# Patient Record
Sex: MALE | Race: NATIVE HAWAIIAN OR OTHER PACIFIC ISLANDER
[De-identification: names, ages, dates, MRNs, and addresses within clinical notes are randomized per-mention and may not be internally consistent; named-entity substitution may affect disease eponyms.]

---

## 2022-09-27 PROBLEM — Z00.00 ENCOUNTER FOR PREVENTIVE HEALTH EXAMINATION: Status: ACTIVE | Noted: 2022-09-27

## 2022-09-29 ENCOUNTER — APPOINTMENT (OUTPATIENT)
Dept: ULTRASOUND IMAGING | Facility: CLINIC | Age: 72
End: 2022-09-29

## 2022-11-01 ENCOUNTER — OFFICE (OUTPATIENT)
Dept: URBAN - METROPOLITAN AREA CLINIC 35 | Facility: CLINIC | Age: 72
Setting detail: OPHTHALMOLOGY
End: 2022-11-01
Payer: MEDICARE

## 2022-11-01 DIAGNOSIS — H40.1131: ICD-10-CM

## 2022-11-01 DIAGNOSIS — H18.413: ICD-10-CM

## 2022-11-01 PROCEDURE — 92083 EXTENDED VISUAL FIELD XM: CPT | Performed by: OPHTHALMOLOGY

## 2022-11-01 PROCEDURE — 92133 CPTRZD OPH DX IMG PST SGM ON: CPT | Performed by: OPHTHALMOLOGY

## 2022-11-01 PROCEDURE — 99213 OFFICE O/P EST LOW 20 MIN: CPT | Performed by: OPHTHALMOLOGY

## 2022-11-01 ASSESSMENT — REFRACTION_MANIFEST
OS_VA1: 20/20
OS_AXIS: 180
OD_VA1: 20/20
OD_ADD: +4.00
OD_AXIS: 005
OS_VA1: 20/25
OS_ADD: +4.00
OS_SPHERE: -4.25
OS_AXIS: 180
OD_VA1: 20/25
OS_CYLINDER: +3.00
OD_ADD: +3.50
OD_SPHERE: -3.75
OD_AXIS: 005
OS_SPHERE: -4.00
OS_CYLINDER: +3.00
OD_CYLINDER: +3.25
OS_ADD: +3.50
OD_SPHERE: -3.50
OD_CYLINDER: +2.75

## 2022-11-01 ASSESSMENT — KERATOMETRY
METHOD_AUTO_MANUAL: AUTO
OD_K2POWER_DIOPTERS: 44.50
OS_K2POWER_DIOPTERS: 44.50
OD_AXISANGLE_DEGREES: 004
OS_K1POWER_DIOPTERS: 43.25
OD_K1POWER_DIOPTERS: 42.50
OS_AXISANGLE_DEGREES: 004

## 2022-11-01 ASSESSMENT — REFRACTION_AUTOREFRACTION
OS_SPHERE: -4.00
OD_AXIS: 007
OD_SPHERE: -4.50
OD_CYLINDER: +3.75
OS_AXIS: 180
OS_CYLINDER: +2.75

## 2022-11-01 ASSESSMENT — AXIALLENGTH_DERIVED
OS_AL: 24.4589
OD_AL: 24.4499
OS_AL: 24.564
OS_AL: 24.5114
OD_AL: 24.4499
OD_AL: 24.661

## 2022-11-01 ASSESSMENT — PACHYMETRY
OD_CT_UM: 532
OD_CT_CORRECTION: 1
OS_CT_CORRECTION: 1
OS_CT_UM: 535

## 2022-11-01 ASSESSMENT — TONOMETRY
OD_IOP_MMHG: 13
OS_IOP_MMHG: 14

## 2022-11-01 ASSESSMENT — SUPERFICIAL PUNCTATE KERATITIS (SPK)
OS_SPK: T
OD_SPK: T

## 2022-11-01 ASSESSMENT — REFRACTION_CURRENTRX
OD_OVR_VA: 20/
OS_CYLINDER: +3.25
OD_SPHERE: -3.75
OD_ADD: +3.25
OS_VPRISM_DIRECTION: PROGS
OD_CYLINDER: +3.25
OD_AXIS: 179
OS_AXIS: 003
OS_SPHERE: -4.50
OS_OVR_VA: 20/
OD_VPRISM_DIRECTION: PROGS
OS_ADD: +3.25

## 2022-11-01 ASSESSMENT — CONFRONTATIONAL VISUAL FIELD TEST (CVF)
OS_FINDINGS: FULL
OD_FINDINGS: FULL

## 2022-11-01 ASSESSMENT — SPHEQUIV_DERIVED
OD_SPHEQUIV: -2.125
OD_SPHEQUIV: -2.125
OS_SPHEQUIV: -2.625
OS_SPHEQUIV: -2.5
OD_SPHEQUIV: -2.625
OS_SPHEQUIV: -2.75

## 2022-11-01 ASSESSMENT — VISUAL ACUITY
OD_BCVA: 20/30-2
OS_BCVA: 20/60-1

## 2023-02-01 ENCOUNTER — OFFICE (OUTPATIENT)
Dept: URBAN - METROPOLITAN AREA CLINIC 35 | Facility: CLINIC | Age: 73
Setting detail: OPHTHALMOLOGY
End: 2023-02-01
Payer: MEDICARE

## 2023-02-01 DIAGNOSIS — H40.1131: ICD-10-CM

## 2023-02-01 DIAGNOSIS — H18.413: ICD-10-CM

## 2023-02-01 DIAGNOSIS — H11.153: ICD-10-CM

## 2023-02-01 PROCEDURE — 99213 OFFICE O/P EST LOW 20 MIN: CPT | Performed by: OPHTHALMOLOGY

## 2023-02-01 ASSESSMENT — REFRACTION_MANIFEST
OD_CYLINDER: +2.75
OD_VA1: 20/20
OD_AXIS: 005
OD_ADD: +3.50
OD_SPHERE: -3.50
OS_ADD: +3.50
OD_CYLINDER: +3.25
OS_SPHERE: -4.25
OS_CYLINDER: +3.00
OS_VA1: 20/25
OS_SPHERE: -4.00
OS_AXIS: 180
OD_VA1: 20/25
OS_ADD: +4.00
OD_ADD: +4.00
OD_AXIS: 005
OS_AXIS: 180
OS_VA1: 20/20
OD_SPHERE: -3.75
OS_CYLINDER: +3.00

## 2023-02-01 ASSESSMENT — VISUAL ACUITY
OS_BCVA: 20/40-2
OD_BCVA: 20/40-2

## 2023-02-01 ASSESSMENT — REFRACTION_AUTOREFRACTION
OD_CYLINDER: +3.50
OS_AXIS: 000
OS_SPHERE: -4.25
OD_SPHERE: -4.25
OD_AXIS: 005
OS_CYLINDER: +3.00

## 2023-02-01 ASSESSMENT — REFRACTION_CURRENTRX
OD_SPHERE: -3.75
OD_CYLINDER: +3.25
OD_ADD: +3.25
OD_AXIS: 179
OS_VPRISM_DIRECTION: PROGS
OD_OVR_VA: 20/
OS_AXIS: 003
OD_VPRISM_DIRECTION: PROGS
OS_OVR_VA: 20/
OS_ADD: +3.25
OS_CYLINDER: +3.25
OS_SPHERE: -4.50

## 2023-02-01 ASSESSMENT — PACHYMETRY
OD_CT_CORRECTION: 1
OD_CT_UM: 532
OS_CT_CORRECTION: 1
OS_CT_UM: 535

## 2023-02-01 ASSESSMENT — CONFRONTATIONAL VISUAL FIELD TEST (CVF)
OS_FINDINGS: FULL
OD_FINDINGS: FULL

## 2023-02-01 ASSESSMENT — KERATOMETRY
OS_AXISANGLE_DEGREES: 005
METHOD_AUTO_MANUAL: AUTO
OS_K2POWER_DIOPTERS: 44.50
OS_K1POWER_DIOPTERS: 43.00
OD_AXISANGLE_DEGREES: 002
OD_K2POWER_DIOPTERS: 44.50
OD_K1POWER_DIOPTERS: 42.50

## 2023-02-01 ASSESSMENT — AXIALLENGTH_DERIVED
OS_AL: 24.5084
OS_AL: 24.6139
OS_AL: 24.6139
OD_AL: 24.4499
OD_AL: 24.6079
OD_AL: 24.4499

## 2023-02-01 ASSESSMENT — SPHEQUIV_DERIVED
OS_SPHEQUIV: -2.75
OD_SPHEQUIV: -2.5
OD_SPHEQUIV: -2.125
OD_SPHEQUIV: -2.125
OS_SPHEQUIV: -2.75
OS_SPHEQUIV: -2.5

## 2023-02-01 ASSESSMENT — TONOMETRY
OS_IOP_MMHG: 19
OD_IOP_MMHG: 15

## 2023-02-01 ASSESSMENT — SUPERFICIAL PUNCTATE KERATITIS (SPK)
OS_SPK: T
OD_SPK: T

## 2023-03-06 PROBLEM — H04.553 NLDO: Status: ACTIVE | Noted: 2023-03-06

## 2023-03-17 ENCOUNTER — APPOINTMENT (OUTPATIENT)
Dept: GASTROENTEROLOGY | Facility: CLINIC | Age: 73
End: 2023-03-17
Payer: MEDICARE

## 2023-03-17 VITALS
SYSTOLIC BLOOD PRESSURE: 122 MMHG | HEIGHT: 67 IN | OXYGEN SATURATION: 98 % | WEIGHT: 170 LBS | BODY MASS INDEX: 26.68 KG/M2 | HEART RATE: 76 BPM | DIASTOLIC BLOOD PRESSURE: 70 MMHG | TEMPERATURE: 97.5 F

## 2023-03-17 PROCEDURE — 99204 OFFICE O/P NEW MOD 45 MIN: CPT

## 2023-03-17 RX ORDER — ASPIRIN 81 MG/1
81 TABLET ORAL
Refills: 0 | Status: ACTIVE | COMMUNITY

## 2023-03-17 RX ORDER — LINACLOTIDE 145 UG/1
145 CAPSULE, GELATIN COATED ORAL
Refills: 0 | Status: ACTIVE | COMMUNITY

## 2023-03-17 RX ORDER — METFORMIN HYDROCHLORIDE 1000 MG/1
1000 TABLET, COATED ORAL
Refills: 0 | Status: ACTIVE | COMMUNITY

## 2023-03-17 RX ORDER — PRAVASTATIN SODIUM 80 MG/1
80 TABLET ORAL
Refills: 0 | Status: ACTIVE | COMMUNITY

## 2023-03-17 RX ORDER — CARBIDOPA 25 MG/1
25 TABLET ORAL
Refills: 0 | Status: ACTIVE | COMMUNITY

## 2023-03-17 RX ORDER — CLOPIDOGREL 75 MG/1
75 TABLET, FILM COATED ORAL
Refills: 0 | Status: ACTIVE | COMMUNITY

## 2023-03-17 RX ORDER — DORZOLAMIDE HYDROCHLORIDE AND TIMOLOL MALEATE PRESERVATIVE FREE 20; 5 MG/ML; MG/ML
SOLUTION/ DROPS OPHTHALMIC
Refills: 0 | Status: ACTIVE | COMMUNITY

## 2023-03-17 RX ORDER — LATANOPROST/PF 0.005 %
0.01 DROPS OPHTHALMIC (EYE)
Refills: 0 | Status: ACTIVE | COMMUNITY

## 2023-03-17 RX ORDER — ISOSORBIDE MONONITRATE 30 MG
30 TABLET, EXTENDED RELEASE 24 HR ORAL
Refills: 0 | Status: ACTIVE | COMMUNITY

## 2023-03-17 RX ORDER — METOPROLOL SUCCINATE 25 MG/1
25 TABLET, EXTENDED RELEASE ORAL
Refills: 0 | Status: ACTIVE | COMMUNITY

## 2023-03-17 NOTE — ASSESSMENT
[FreeTextEntry1] : The patient is a 72-year-old male who has a history of Parkinson's disease as well as coronary artery disease hypothyroidism and type 2 diabetes.  Patient has a remote history of colitis which is currently in clinical remission.  In fact, the patient is constipated likely on the basis of his Parkinson's disease and medications.  The patient has no clinical symptoms of intestinal obstruction.  The patient was instructed to increase the Linzess to 290 mcg once a day and start soluble fiber supplements.  He was taking MiraLAX on and off which he can stop.  Patient or his daughter will get back to me with a progress report in 2 weeks.  If the higher dose Linzess is not efficacious we may switch him over to Trulance or Amitiza.  Patient is not due for repeat colonoscopy at the present time.

## 2023-03-17 NOTE — PHYSICAL EXAM
[Alert] : alert [Healthy Appearing] : healthy appearing [No Acute Distress] : no acute distress [No Respiratory Distress] : no respiratory distress [No Acc Muscle Use] : no accessory muscle use [Heart Rate And Rhythm] : heart rate was normal and rhythm regular [Murmurs] : no murmurs [Bowel Sounds] : normal bowel sounds [Abdomen Tenderness] : non-tender [No Masses] : no abdominal mass palpated [Abdomen Soft] : soft [de-identified] : The patient does have a typical parkinsonian tremor.

## 2023-03-17 NOTE — REVIEW OF SYSTEMS
[Fever] : no fever [Chills] : no chills [Recent Weight Loss (___ Lbs)] : no recent weight loss [Chest Pain] : no chest pain [Palpitations] : no palpitations [Cough] : no cough [SOB on Exertion] : no shortness of breath during exertion [Abdominal Pain] : no abdominal pain [Constipation] : constipation [Diarrhea] : no diarrhea [Heartburn] : no heartburn [Melena (black stool)] : no melena [As Noted in HPI] : as noted in HPI [de-identified] : The patient has Parkinson's disease

## 2023-05-01 RX ORDER — MESALAMINE 0.38 G/1
0.38 CAPSULE, EXTENDED RELEASE ORAL
Qty: 360 | Refills: 3 | Status: ACTIVE | COMMUNITY
Start: 1900-01-01 | End: 1900-01-01

## 2023-05-09 ENCOUNTER — NON-APPOINTMENT (OUTPATIENT)
Age: 73
End: 2023-05-09

## 2023-05-09 RX ORDER — SODIUM PICOSULFATE, MAGNESIUM OXIDE, AND ANHYDROUS CITRIC ACID 10; 3.5; 12 MG/160ML; G/160ML; G/160ML
10-3.5-12 MG-GM LIQUID ORAL
Qty: 2 | Refills: 0 | Status: ACTIVE | COMMUNITY
Start: 2023-05-09 | End: 1900-01-01

## 2023-05-23 ENCOUNTER — OFFICE (OUTPATIENT)
Dept: URBAN - METROPOLITAN AREA CLINIC 35 | Facility: CLINIC | Age: 73
Setting detail: OPHTHALMOLOGY
End: 2023-05-23
Payer: MEDICARE

## 2023-05-23 DIAGNOSIS — H40.1131: ICD-10-CM

## 2023-05-23 DIAGNOSIS — H52.4: ICD-10-CM

## 2023-05-23 DIAGNOSIS — H25.13: ICD-10-CM

## 2023-05-23 DIAGNOSIS — H18.413: ICD-10-CM

## 2023-05-23 DIAGNOSIS — E11.3292: ICD-10-CM

## 2023-05-23 DIAGNOSIS — H52.7: ICD-10-CM

## 2023-05-23 PROBLEM — H11.15 PINGUECULA: Status: ACTIVE | Noted: 2023-05-23

## 2023-05-23 PROCEDURE — 92015 DETERMINE REFRACTIVE STATE: CPT | Performed by: OPHTHALMOLOGY

## 2023-05-23 PROCEDURE — 92014 COMPRE OPH EXAM EST PT 1/>: CPT | Performed by: OPHTHALMOLOGY

## 2023-05-23 PROCEDURE — 92250 FUNDUS PHOTOGRAPHY W/I&R: CPT | Performed by: OPHTHALMOLOGY

## 2023-05-23 ASSESSMENT — PACHYMETRY
OS_CT_UM: 535
OS_CT_CORRECTION: 1
OD_CT_CORRECTION: 1
OD_CT_UM: 532

## 2023-05-23 ASSESSMENT — SPHEQUIV_DERIVED
OD_SPHEQUIV: -2.875
OD_SPHEQUIV: -2.125
OS_SPHEQUIV: -2.75
OD_SPHEQUIV: -2.625
OS_SPHEQUIV: -2.5
OD_SPHEQUIV: -2.125

## 2023-05-23 ASSESSMENT — REFRACTION_AUTOREFRACTION
OS_CYLINDER: +3.00
OS_SPHERE: -4.25
OD_CYLINDER: +3.75
OS_AXIS: 003
OD_SPHERE: -4.75
OD_AXIS: 008

## 2023-05-23 ASSESSMENT — CONFRONTATIONAL VISUAL FIELD TEST (CVF)
OS_FINDINGS: FULL
OD_FINDINGS: FULL

## 2023-05-23 ASSESSMENT — REFRACTION_MANIFEST
OD_CYLINDER: +3.25
OS_CYLINDER: +3.00
OS_SPHERE: -4.25
OD_ADD: +3.50
OS_CYLINDER: +3.00
OD_SPHERE: -3.50
OD_ADD: +3.50
OS_VA1: 20/25
OS_ADD: +3.50
OD_CYLINDER: +3.25
OS_AXIS: 180
OS_AXIS: 180
OS_SPHERE: -4.00
OD_SPHERE: -3.75
OS_AXIS: 180
OD_ADD: +4.00
OS_VA1: 20/20
OD_SPHERE: -4.25
OD_AXIS: 010
OS_CYLINDER: +3.00
OD_VA1: 20/20
OS_ADD: +4.00
OS_SPHERE: -4.25
OS_ADD: +3.50
OS_VA1: 20/25
OD_AXIS: 005
OD_CYLINDER: +2.75
OD_AXIS: 005
OD_VA1: 20/30
OD_VA1: 20/25

## 2023-05-23 ASSESSMENT — AXIALLENGTH_DERIVED
OD_AL: 24.7112
OS_AL: 24.664
OS_AL: 24.558
OD_AL: 24.4993
OD_AL: 24.8186
OD_AL: 24.4993

## 2023-05-23 ASSESSMENT — REFRACTION_CURRENTRX
OD_CYLINDER: +3.50
OS_CYLINDER: +3.25
OS_AXIS: 002
OS_OVR_VA: 20/
OS_SPHERE: -4.25
OS_ADD: +3.25
OD_SPHERE: -3.75
OD_AXIS: 009
OD_OVR_VA: 20/
OD_VPRISM_DIRECTION: PROGS
OS_VPRISM_DIRECTION: PROGS
OD_ADD: +3.25

## 2023-05-23 ASSESSMENT — KERATOMETRY
OD_K2POWER_DIOPTERS: 44.25
OD_K1POWER_DIOPTERS: 42.50
OS_AXISANGLE_DEGREES: 005
OS_K1POWER_DIOPTERS: 42.75
OS_K2POWER_DIOPTERS: 44.50
OD_AXISANGLE_DEGREES: 008
METHOD_AUTO_MANUAL: AUTO

## 2023-05-23 ASSESSMENT — SUPERFICIAL PUNCTATE KERATITIS (SPK)
OD_SPK: T
OS_SPK: T

## 2023-05-23 ASSESSMENT — TONOMETRY
OS_IOP_MMHG: 12
OD_IOP_MMHG: 10

## 2023-05-23 ASSESSMENT — VISUAL ACUITY
OD_BCVA: 20/40-2
OS_BCVA: 20/40-2

## 2023-05-25 ENCOUNTER — RX RENEWAL (OUTPATIENT)
Age: 73
End: 2023-05-25

## 2023-05-31 ENCOUNTER — APPOINTMENT (OUTPATIENT)
Dept: GASTROENTEROLOGY | Facility: AMBULATORY MEDICAL SERVICES | Age: 73
End: 2023-05-31
Payer: MEDICARE

## 2023-05-31 PROCEDURE — 45380 COLONOSCOPY AND BIOPSY: CPT

## 2023-09-07 ENCOUNTER — APPOINTMENT (OUTPATIENT)
Dept: GASTROENTEROLOGY | Facility: CLINIC | Age: 73
End: 2023-09-07
Payer: MEDICARE

## 2023-09-07 ENCOUNTER — RX RENEWAL (OUTPATIENT)
Age: 73
End: 2023-09-07

## 2023-09-07 VITALS
WEIGHT: 152 LBS | DIASTOLIC BLOOD PRESSURE: 76 MMHG | HEIGHT: 67 IN | SYSTOLIC BLOOD PRESSURE: 110 MMHG | HEART RATE: 81 BPM | TEMPERATURE: 97.1 F | BODY MASS INDEX: 23.86 KG/M2 | OXYGEN SATURATION: 98 %

## 2023-09-07 PROCEDURE — 99214 OFFICE O/P EST MOD 30 MIN: CPT

## 2023-09-07 NOTE — REVIEW OF SYSTEMS
[Fever] : no fever [Chills] : no chills [Recent Weight Loss (___ Lbs)] : no recent weight loss [Chest Pain] : no chest pain [Palpitations] : no palpitations [SOB on Exertion] : no shortness of breath during exertion [Abdominal Pain] : no abdominal pain [Constipation] : constipation [Diarrhea] : no diarrhea [Heartburn] : no heartburn [Melena (black stool)] : no melena [Bleeding] : no bleeding [Bloating (gassiness)] : no bloating

## 2023-09-07 NOTE — PHYSICAL EXAM
[Alert] : alert [No Acute Distress] : no acute distress [No Respiratory Distress] : no respiratory distress [Auscultation Breath Sounds / Voice Sounds] : lungs were clear to auscultation bilaterally [Heart Rate And Rhythm] : heart rate was normal and rhythm regular [Murmurs] : no murmurs [Bowel Sounds] : normal bowel sounds [No Masses] : no abdominal mass palpated [Abdomen Soft] : soft [] : no hepatosplenomegaly [Patient Refused] : rectal exam was refused by the patient

## 2023-09-07 NOTE — HISTORY OF PRESENT ILLNESS
[FreeTextEntry1] : The patient had a colonoscopy earlier this year which did reveal disease activity.

## 2023-09-07 NOTE — ASSESSMENT
[FreeTextEntry1] : The patient is a 73-year-old male with a history of Parkinson's disease, type 2 diabetes and coronary artery disease.  The patient has ulcerative colitis but interestingly is plagued by significant constipation.  This is likely on the basis of the patient's underlying Parkinson's disease and medications.  He tried Linzess 145 mcg with no results.  I asked the patient to increase the medication to 290 mcg once a day for the next 4 to 5 days.  If there are no results, we may need to give the patient a formal bowel prep to clean him out.  I am not happy with the fact that the patient has underlying colonic inflammation, however he is hesitant to change his medication at the present time.  Mr. Miner will get back to me with a progress report in several days.

## 2023-09-18 ENCOUNTER — OFFICE (OUTPATIENT)
Dept: URBAN - METROPOLITAN AREA CLINIC 35 | Facility: CLINIC | Age: 73
Setting detail: OPHTHALMOLOGY
End: 2023-09-18
Payer: MEDICARE

## 2023-09-18 DIAGNOSIS — H18.413: ICD-10-CM

## 2023-09-18 DIAGNOSIS — H25.13: ICD-10-CM

## 2023-09-18 DIAGNOSIS — H11.153: ICD-10-CM

## 2023-09-18 DIAGNOSIS — H40.1131: ICD-10-CM

## 2023-09-18 PROCEDURE — 99213 OFFICE O/P EST LOW 20 MIN: CPT | Performed by: OPHTHALMOLOGY

## 2023-09-18 ASSESSMENT — REFRACTION_MANIFEST
OS_CYLINDER: +3.00
OD_AXIS: 010
OD_ADD: +4.00
OS_ADD: +3.50
OS_CYLINDER: +3.00
OD_SPHERE: -4.25
OD_VA1: 20/30
OS_ADD: +4.00
OS_SPHERE: -4.00
OD_VA1: 20/20
OD_ADD: +3.50
OD_VA1: 20/25
OS_ADD: +3.50
OD_CYLINDER: +2.75
OD_SPHERE: -3.75
OS_CYLINDER: +3.00
OD_CYLINDER: +3.25
OD_AXIS: 005
OD_CYLINDER: +3.25
OS_AXIS: 180
OS_VA1: 20/25
OS_VA1: 20/20
OS_VA1: 20/25
OS_AXIS: 180
OS_SPHERE: -4.25
OD_SPHERE: -3.50
OS_AXIS: 180
OS_SPHERE: -4.25
OD_AXIS: 005
OD_ADD: +3.50

## 2023-09-18 ASSESSMENT — AXIALLENGTH_DERIVED
OD_AL: 24.4499
OS_AL: 24.6139
OD_AL: 24.4499
OS_AL: 24.5084
OS_AL: 24.6139
OD_AL: 24.661
OS_AL: 24.6139
OD_AL: 24.7678

## 2023-09-18 ASSESSMENT — REFRACTION_CURRENTRX
OS_VPRISM_DIRECTION: PROGS
OS_SPHERE: -4.25
OD_VPRISM_DIRECTION: PROGS
OD_AXIS: 005
OD_OVR_VA: 20/
OS_CYLINDER: +3.00
OS_OVR_VA: 20/
OS_AXIS: 005
OS_ADD: +3.50
OD_ADD: +3.50
OD_SPHERE: -4.50
OD_CYLINDER: +3.50

## 2023-09-18 ASSESSMENT — KERATOMETRY
OD_K2POWER_DIOPTERS: 44.50
OS_AXISANGLE_DEGREES: 010
OS_K2POWER_DIOPTERS: 44.75
OD_AXISANGLE_DEGREES: 004
OD_K1POWER_DIOPTERS: 42.50
METHOD_AUTO_MANUAL: AUTO
OS_K1POWER_DIOPTERS: 42.75

## 2023-09-18 ASSESSMENT — TONOMETRY
OS_IOP_MMHG: 16
OD_IOP_MMHG: 13

## 2023-09-18 ASSESSMENT — REFRACTION_AUTOREFRACTION
OS_CYLINDER: +3.50
OS_AXIS: 003
OS_SPHERE: -4.50
OD_AXIS: 007
OD_SPHERE: -4.75
OD_CYLINDER: +3.75

## 2023-09-18 ASSESSMENT — SPHEQUIV_DERIVED
OS_SPHEQUIV: -2.75
OS_SPHEQUIV: -2.5
OD_SPHEQUIV: -2.875
OS_SPHEQUIV: -2.75
OS_SPHEQUIV: -2.75
OD_SPHEQUIV: -2.625
OD_SPHEQUIV: -2.125
OD_SPHEQUIV: -2.125

## 2023-09-18 ASSESSMENT — PACHYMETRY
OD_CT_CORRECTION: 1
OS_CT_UM: 535
OS_CT_CORRECTION: 1
OD_CT_UM: 532

## 2023-09-18 ASSESSMENT — CONFRONTATIONAL VISUAL FIELD TEST (CVF)
OS_FINDINGS: FULL
OD_FINDINGS: FULL

## 2023-09-18 ASSESSMENT — VISUAL ACUITY
OS_BCVA: 20/40
OD_BCVA: 20/40-

## 2023-11-20 ENCOUNTER — RX RENEWAL (OUTPATIENT)
Age: 73
End: 2023-11-20

## 2023-11-22 ENCOUNTER — RX RENEWAL (OUTPATIENT)
Age: 73
End: 2023-11-22

## 2023-12-18 RX ORDER — LUBIPROSTONE 24 UG/1
24 CAPSULE ORAL
Qty: 180 | Refills: 3 | Status: ACTIVE | COMMUNITY
Start: 2023-09-18 | End: 1900-01-01

## 2024-01-22 ENCOUNTER — OFFICE (OUTPATIENT)
Dept: URBAN - METROPOLITAN AREA CLINIC 35 | Facility: CLINIC | Age: 74
Setting detail: OPHTHALMOLOGY
End: 2024-01-22
Payer: MEDICARE

## 2024-01-22 DIAGNOSIS — H18.413: ICD-10-CM

## 2024-01-22 DIAGNOSIS — H40.1131: ICD-10-CM

## 2024-01-22 DIAGNOSIS — H25.13: ICD-10-CM

## 2024-01-22 DIAGNOSIS — H11.153: ICD-10-CM

## 2024-01-22 PROCEDURE — 99213 OFFICE O/P EST LOW 20 MIN: CPT | Performed by: OPHTHALMOLOGY

## 2024-01-22 ASSESSMENT — REFRACTION_CURRENTRX
OD_CYLINDER: +3.50
OS_OVR_VA: 20/
OS_CYLINDER: +3.00
OS_AXIS: 005
OD_SPHERE: -4.50
OS_OVR_VA: 20/
OS_ADD: +3.75
OD_OVR_VA: 20/
OD_ADD: +3.75
OD_VPRISM_DIRECTION: PROGS
OS_SPHERE: -4.50
OD_OVR_VA: 20/
OS_VPRISM_DIRECTION: PROGS
OD_AXIS: 010

## 2024-01-22 ASSESSMENT — REFRACTION_MANIFEST
OS_CYLINDER: +3.00
OD_ADD: +3.50
OS_ADD: +3.50
OD_AXIS: 010
OD_VA1: 20/25
OD_AXIS: 005
OD_CYLINDER: +3.25
OS_CYLINDER: +3.00
OD_SPHERE: -3.75
OD_SPHERE: -5.25
OD_VA1: 20/20
OD_CYLINDER: +3.25
OD_AXIS: 005
OS_AXIS: 180
OS_SPHERE: -4.25
OD_CYLINDER: +3.25
OS_SPHERE: -4.50
OS_AXIS: 180
OS_AXIS: 180
OS_VA1: 20/25
OD_SPHERE: -3.50
OS_VA1: 20/25
OS_CYLINDER: +3.00
OS_SPHERE: -4.25
OS_VA1: 20/25
OD_CYLINDER: +2.75
OS_ADD: +4.00
OS_SPHERE: -4.00
OS_CYLINDER: +2.75
OD_VA1: 20/40+
OD_VA1: 20/30
OS_ADD: +3.50
OS_VA1: 20/20
OD_ADD: +3.50
OS_ADD: +3.50
OS_AXIS: 180
OD_ADD: +4.00
OD_SPHERE: -4.25
OD_ADD: +3.50
OD_AXIS: 010

## 2024-01-22 ASSESSMENT — REFRACTION_AUTOREFRACTION
OD_AXIS: 003
OD_SPHERE: -5.25
OD_CYLINDER: +3.25
OS_SPHERE: -4.50
OS_CYLINDER: +2.50
OS_AXIS: 003

## 2024-01-22 ASSESSMENT — SPHEQUIV_DERIVED
OD_SPHEQUIV: -2.125
OS_SPHEQUIV: -2.5
OS_SPHEQUIV: -3.125
OS_SPHEQUIV: -3.25
OS_SPHEQUIV: -2.75
OD_SPHEQUIV: -3.625
OS_SPHEQUIV: -2.75
OD_SPHEQUIV: -3.625
OD_SPHEQUIV: -2.125
OD_SPHEQUIV: -2.625

## 2024-01-22 ASSESSMENT — CONFRONTATIONAL VISUAL FIELD TEST (CVF)
OS_FINDINGS: FULL
OD_FINDINGS: FULL

## 2024-01-23 ENCOUNTER — RX ONLY (RX ONLY)
Age: 74
End: 2024-01-23

## 2024-01-23 ENCOUNTER — RX RENEWAL (OUTPATIENT)
Age: 74
End: 2024-01-23

## 2024-01-23 ENCOUNTER — OFFICE (OUTPATIENT)
Dept: URBAN - METROPOLITAN AREA CLINIC 34 | Facility: CLINIC | Age: 74
Setting detail: OPHTHALMOLOGY
End: 2024-01-23
Payer: MEDICARE

## 2024-01-23 DIAGNOSIS — H43.813: ICD-10-CM

## 2024-01-23 DIAGNOSIS — H01.004: ICD-10-CM

## 2024-01-23 DIAGNOSIS — H01.001: ICD-10-CM

## 2024-01-23 DIAGNOSIS — H00.022: ICD-10-CM

## 2024-01-23 DIAGNOSIS — H40.1131: ICD-10-CM

## 2024-01-23 DIAGNOSIS — H25.13: ICD-10-CM

## 2024-01-23 DIAGNOSIS — H00.025: ICD-10-CM

## 2024-01-23 DIAGNOSIS — H16.222: ICD-10-CM

## 2024-01-23 DIAGNOSIS — E11.9: ICD-10-CM

## 2024-01-23 PROBLEM — H35.031 HYPERTENSIVE RETINOPATHY; RIGHT EYE: Status: ACTIVE | Noted: 2024-01-23

## 2024-01-23 PROCEDURE — 99214 OFFICE O/P EST MOD 30 MIN: CPT | Performed by: OPHTHALMOLOGY

## 2024-01-23 PROCEDURE — 92134 CPTRZ OPH DX IMG PST SGM RTA: CPT | Performed by: OPHTHALMOLOGY

## 2024-01-23 RX ORDER — MESALAMINE 1.2 G/1
1.2 TABLET, DELAYED RELEASE ORAL
Qty: 270 | Refills: 0 | Status: ACTIVE | COMMUNITY
Start: 2024-01-23 | End: 1900-01-01

## 2024-01-23 ASSESSMENT — SPHEQUIV_DERIVED
OS_SPHEQUIV: -3
OS_SPHEQUIV: -2.5
OS_SPHEQUIV: -2.75
OD_SPHEQUIV: -2.125
OD_SPHEQUIV: -3.625
OD_SPHEQUIV: -2.625
OS_SPHEQUIV: -2.75
OD_SPHEQUIV: -3.625
OD_SPHEQUIV: -2.125
OS_SPHEQUIV: -3.125

## 2024-01-23 ASSESSMENT — REFRACTION_MANIFEST
OS_VA1: 20/20
OD_AXIS: 010
OS_ADD: +3.50
OS_CYLINDER: +3.00
OS_ADD: +3.50
OS_ADD: +4.00
OD_VA1: 20/40+
OD_VA1: 20/25
OS_VA1: 20/25
OS_SPHERE: -4.25
OS_AXIS: 180
OS_ADD: +3.50
OS_SPHERE: -4.00
OD_ADD: +3.50
OD_AXIS: 005
OD_VA1: 20/20
OS_CYLINDER: +3.00
OS_AXIS: 180
OD_CYLINDER: +3.25
OD_ADD: +4.00
OD_AXIS: 005
OD_CYLINDER: +3.25
OD_CYLINDER: +3.25
OD_SPHERE: -5.25
OS_CYLINDER: +2.75
OS_CYLINDER: +3.00
OS_SPHERE: -4.25
OS_AXIS: 180
OS_SPHERE: -4.50
OD_ADD: +3.50
OD_SPHERE: -3.75
OD_CYLINDER: +2.75
OD_AXIS: 010
OD_VA1: 20/30
OS_AXIS: 180
OS_VA1: 20/25
OD_SPHERE: -3.50
OD_SPHERE: -4.25
OS_VA1: 20/25
OD_ADD: +3.50

## 2024-01-23 ASSESSMENT — REFRACTION_AUTOREFRACTION
OS_AXIS: 005
OS_CYLINDER: +3.50
OD_CYLINDER: +3.25
OS_SPHERE: -4.75
OD_AXIS: 007
OD_SPHERE: -5.25

## 2024-01-23 ASSESSMENT — LID EXAM ASSESSMENTS
OS_BLEPHARITIS: LUL 1+
OD_MEIBOMITIS: RLL T 1+
OS_COMMENTS: TELANGIECTASIA, SHORTENED GLANDS
OD_COMMENTS: TELANGIECTASIA, SHORTENED GLANDS
OD_BLEPHARITIS: RUL 1+
OS_MEIBOMITIS: LLL T 1+

## 2024-01-23 ASSESSMENT — REFRACTION_CURRENTRX
OS_VPRISM_DIRECTION: PROGS
OS_ADD: +3.75
OD_VPRISM_DIRECTION: PROGS
OD_ADD: +3.75
OS_CYLINDER: +3.00
OD_SPHERE: -4.50
OS_AXIS: 005
OS_OVR_VA: 20/
OS_SPHERE: -4.50
OD_CYLINDER: +3.50
OD_OVR_VA: 20/
OD_AXIS: 010

## 2024-01-23 ASSESSMENT — CONFRONTATIONAL VISUAL FIELD TEST (CVF)
OD_FINDINGS: FULL
OS_FINDINGS: FULL

## 2024-01-23 ASSESSMENT — SUPERFICIAL PUNCTATE KERATITIS (SPK): OS_SPK: 1+

## 2024-01-23 ASSESSMENT — DRY EYES - PHYSICIAN NOTES: OS_GENERALCOMMENTS: INFERIOR LIMBUS

## 2024-02-05 ENCOUNTER — OFFICE (OUTPATIENT)
Dept: URBAN - METROPOLITAN AREA CLINIC 34 | Facility: CLINIC | Age: 74
Setting detail: OPHTHALMOLOGY
End: 2024-02-05
Payer: MEDICARE

## 2024-02-05 DIAGNOSIS — H25.11: ICD-10-CM

## 2024-02-05 DIAGNOSIS — H25.13: ICD-10-CM

## 2024-02-05 PROCEDURE — 92136 OPHTHALMIC BIOMETRY: CPT | Mod: 26,RT | Performed by: OPHTHALMOLOGY

## 2024-02-05 PROCEDURE — 92136 OPHTHALMIC BIOMETRY: CPT | Mod: TC | Performed by: OPHTHALMOLOGY

## 2024-02-05 PROCEDURE — 99213 OFFICE O/P EST LOW 20 MIN: CPT | Performed by: OPHTHALMOLOGY

## 2024-02-05 ASSESSMENT — REFRACTION_CURRENTRX
OS_CYLINDER: +3.00
OD_ADD: +3.75
OS_VPRISM_DIRECTION: PROGS
OS_OVR_VA: 20/
OD_SPHERE: -4.50
OD_OVR_VA: 20/
OD_CYLINDER: +3.50
OS_ADD: +3.75
OD_VPRISM_DIRECTION: PROGS
OS_AXIS: 005
OS_SPHERE: -4.50
OD_AXIS: 010

## 2024-02-05 ASSESSMENT — REFRACTION_MANIFEST
OS_CYLINDER: +3.00
OS_AXIS: 180
OS_ADD: +3.50
OD_ADD: +3.50
OD_AXIS: 010
OD_VA1: 20/25
OD_CYLINDER: +3.25
OS_CYLINDER: +3.00
OD_CYLINDER: +3.25
OS_CYLINDER: +3.00
OS_CYLINDER: +2.75
OS_VA1: 20/25
OS_ADD: +4.00
OD_SPHERE: -3.75
OD_SPHERE: -4.25
OS_AXIS: 180
OS_SPHERE: -4.00
OD_ADD: +4.00
OS_AXIS: 180
OD_SPHERE: -3.50
OD_SPHERE: -5.25
OD_ADD: +3.50
OS_SPHERE: -4.50
OS_ADD: +3.50
OD_CYLINDER: +2.75
OD_VA1: 20/30
OS_ADD: +3.50
OD_CYLINDER: +3.25
OS_VA1: 20/20
OD_VA1: 20/40+
OS_SPHERE: -4.25
OS_VA1: 20/25
OD_ADD: +3.50
OS_AXIS: 180
OD_VA1: 20/20
OD_AXIS: 010
OS_SPHERE: -4.25
OS_VA1: 20/25
OD_AXIS: 005
OD_AXIS: 005

## 2024-02-05 ASSESSMENT — REFRACTION_AUTOREFRACTION
OD_SPHERE: -5.50
OS_AXIS: 002
OD_AXIS: 006
OS_CYLINDER: +3.25
OS_SPHERE: -4.50
OD_CYLINDER: +3.75

## 2024-02-05 ASSESSMENT — SPHEQUIV_DERIVED
OS_SPHEQUIV: -2.5
OS_SPHEQUIV: -2.75
OD_SPHEQUIV: -2.625
OD_SPHEQUIV: -3.625
OD_SPHEQUIV: -2.125
OD_SPHEQUIV: -2.125
OS_SPHEQUIV: -3.125
OD_SPHEQUIV: -3.625
OS_SPHEQUIV: -2.75
OS_SPHEQUIV: -2.875

## 2024-02-05 ASSESSMENT — SUPERFICIAL PUNCTATE KERATITIS (SPK): OS_SPK: 1+

## 2024-02-05 ASSESSMENT — CONFRONTATIONAL VISUAL FIELD TEST (CVF)
OS_FINDINGS: FULL
OD_FINDINGS: FULL

## 2024-02-05 ASSESSMENT — DRY EYES - PHYSICIAN NOTES: OS_GENERALCOMMENTS: INFERIOR LIMBUS

## 2024-02-22 ENCOUNTER — ASC (OUTPATIENT)
Dept: URBAN - METROPOLITAN AREA SURGERY 8 | Facility: SURGERY | Age: 74
Setting detail: OPHTHALMOLOGY
End: 2024-02-22
Payer: MEDICARE

## 2024-02-22 DIAGNOSIS — H52.211: ICD-10-CM

## 2024-02-22 DIAGNOSIS — H25.11: ICD-10-CM

## 2024-02-22 PROCEDURE — V2787 ASTIGMATISM-CORRECT FUNCTION: HCPCS | Performed by: OPHTHALMOLOGY

## 2024-02-22 PROCEDURE — FEMTO FEMTOSECOND LASER: Mod: GY | Performed by: OPHTHALMOLOGY

## 2024-02-22 PROCEDURE — 66984 XCAPSL CTRC RMVL W/O ECP: CPT | Mod: RT | Performed by: OPHTHALMOLOGY

## 2024-02-22 PROCEDURE — A9270 NON-COVERED ITEM OR SERVICE: HCPCS | Mod: GY | Performed by: OPHTHALMOLOGY

## 2024-02-22 PROCEDURE — 68841 INSJ RX ELUT IMPLT LAC CANAL: CPT | Mod: RT | Performed by: OPHTHALMOLOGY

## 2024-02-23 ENCOUNTER — OFFICE (OUTPATIENT)
Dept: URBAN - METROPOLITAN AREA CLINIC 35 | Facility: CLINIC | Age: 74
Setting detail: OPHTHALMOLOGY
End: 2024-02-23
Payer: MEDICARE

## 2024-02-23 DIAGNOSIS — Z96.1: ICD-10-CM

## 2024-02-23 DIAGNOSIS — H25.12: ICD-10-CM

## 2024-02-23 PROCEDURE — 92136 OPHTHALMIC BIOMETRY: CPT | Mod: 26,LT | Performed by: OPHTHALMOLOGY

## 2024-02-23 PROCEDURE — 99024 POSTOP FOLLOW-UP VISIT: CPT | Performed by: OPHTHALMOLOGY

## 2024-02-23 ASSESSMENT — REFRACTION_AUTOREFRACTION
OS_AXIS: 180
OS_SPHERE: -4.75
OS_CYLINDER: +2.75
OD_CYLINDER: SPH
OD_SPHERE: -0.25

## 2024-02-23 ASSESSMENT — REFRACTION_MANIFEST
OD_SPHERE: -3.75
OD_SPHERE: -4.25
OS_ADD: +4.00
OD_AXIS: 010
OS_SPHERE: -4.25
OD_CYLINDER: +2.75
OD_ADD: +4.00
OD_AXIS: 005
OS_CYLINDER: +3.00
OD_AXIS: 005
OD_VA1: 20/40+
OS_ADD: +3.50
OD_VA1: 20/20
OD_ADD: +3.50
OS_CYLINDER: +2.75
OS_AXIS: 180
OD_ADD: +3.50
OD_ADD: +3.50
OD_SPHERE: -3.50
OS_SPHERE: -4.25
OS_SPHERE: -4.50
OS_AXIS: 180
OD_VA1: 20/30
OS_CYLINDER: +3.00
OD_VA1: 20/25
OD_CYLINDER: +3.25
OD_CYLINDER: +3.25
OS_VA1: 20/25
OS_VA1: 20/20
OD_SPHERE: -5.25
OS_VA1: 20/25
OS_ADD: +3.50
OS_AXIS: 180
OS_VA1: 20/25
OD_CYLINDER: +3.25
OD_AXIS: 010
OS_AXIS: 180
OS_SPHERE: -4.00
OS_CYLINDER: +3.00
OS_ADD: +3.50

## 2024-02-23 ASSESSMENT — LID EXAM ASSESSMENTS
OD_BLEPHARITIS: RUL 1+
OS_BLEPHARITIS: LUL 1+
OS_COMMENTS: TELANGIECTASIA, SHORTENED GLANDS
OS_MEIBOMITIS: LLL T 1+
OD_MEIBOMITIS: RLL T 1+

## 2024-02-23 ASSESSMENT — REFRACTION_CURRENTRX
OD_CYLINDER: +3.50
OS_SPHERE: -4.50
OS_CYLINDER: +3.00
OS_VPRISM_DIRECTION: PROGS
OD_ADD: +3.75
OD_VPRISM_DIRECTION: PROGS
OS_OVR_VA: 20/
OS_ADD: +3.75
OD_OVR_VA: 20/
OD_AXIS: 010
OS_AXIS: 005
OD_SPHERE: -4.50

## 2024-02-23 ASSESSMENT — SPHEQUIV_DERIVED
OD_SPHEQUIV: -3.625
OS_SPHEQUIV: -2.5
OS_SPHEQUIV: -3.375
OS_SPHEQUIV: -2.75
OS_SPHEQUIV: -2.75
OD_SPHEQUIV: -2.625
OS_SPHEQUIV: -3.125
OD_SPHEQUIV: -2.125
OD_SPHEQUIV: -2.125

## 2024-02-23 ASSESSMENT — DRY EYES - PHYSICIAN NOTES: OS_GENERALCOMMENTS: INFERIOR LIMBUS

## 2024-02-23 ASSESSMENT — SUPERFICIAL PUNCTATE KERATITIS (SPK): OS_SPK: 1+

## 2024-02-23 ASSESSMENT — CONFRONTATIONAL VISUAL FIELD TEST (CVF)
OD_FINDINGS: FULL
OS_FINDINGS: FULL

## 2024-03-01 ENCOUNTER — ASC (OUTPATIENT)
Dept: URBAN - METROPOLITAN AREA SURGERY 8 | Facility: SURGERY | Age: 74
Setting detail: OPHTHALMOLOGY
End: 2024-03-01
Payer: MEDICARE

## 2024-03-01 DIAGNOSIS — H25.12: ICD-10-CM

## 2024-03-01 DIAGNOSIS — H52.212: ICD-10-CM

## 2024-03-01 PROCEDURE — A9270 NON-COVERED ITEM OR SERVICE: HCPCS | Mod: GY | Performed by: OPHTHALMOLOGY

## 2024-03-01 PROCEDURE — 68841 INSJ RX ELUT IMPLT LAC CANAL: CPT | Mod: 79,LT | Performed by: OPHTHALMOLOGY

## 2024-03-01 PROCEDURE — V2787 ASTIGMATISM-CORRECT FUNCTION: HCPCS | Performed by: OPHTHALMOLOGY

## 2024-03-01 PROCEDURE — 66984 XCAPSL CTRC RMVL W/O ECP: CPT | Mod: 79,LT | Performed by: OPHTHALMOLOGY

## 2024-03-01 PROCEDURE — FEMTO FEMTOSECOND LASER: Mod: GY | Performed by: OPHTHALMOLOGY

## 2024-03-02 ENCOUNTER — OFFICE (OUTPATIENT)
Dept: URBAN - METROPOLITAN AREA CLINIC 34 | Facility: CLINIC | Age: 74
Setting detail: OPHTHALMOLOGY
End: 2024-03-02
Payer: MEDICARE

## 2024-03-02 DIAGNOSIS — Z96.1: ICD-10-CM

## 2024-03-02 PROBLEM — H25.12 CATARACT SENILE NUCLEAR SCLEROSIS; LEFT EYE: Status: RESOLVED | Noted: 2024-02-23 | Resolved: 2024-03-02

## 2024-03-02 PROCEDURE — 99024 POSTOP FOLLOW-UP VISIT: CPT | Performed by: OPHTHALMOLOGY

## 2024-03-02 ASSESSMENT — REFRACTION_CURRENTRX
OD_SPHERE: -4.50
OS_SPHERE: -4.50
OS_CYLINDER: +3.00
OS_ADD: +3.75
OS_VPRISM_DIRECTION: PROGS
OD_OVR_VA: 20/
OD_ADD: +3.75
OS_OVR_VA: 20/
OD_CYLINDER: +3.50
OD_AXIS: 010
OD_VPRISM_DIRECTION: PROGS
OS_AXIS: 005

## 2024-03-02 ASSESSMENT — REFRACTION_MANIFEST
OD_SPHERE: -3.75
OD_SPHERE: -5.25
OS_VA1: 20/20
OS_SPHERE: -4.50
OS_AXIS: 180
OD_AXIS: 010
OD_VA1: 20/40+
OD_AXIS: 005
OS_SPHERE: -4.25
OS_ADD: +3.50
OS_ADD: +4.00
OD_ADD: +4.00
OS_SPHERE: -4.00
OS_ADD: +3.50
OS_SPHERE: -4.25
OS_VA1: 20/25
OD_VA1: 20/25
OD_CYLINDER: +3.25
OS_CYLINDER: +3.00
OS_AXIS: 180
OS_CYLINDER: +2.75
OS_ADD: +3.50
OD_AXIS: 010
OD_CYLINDER: +3.25
OS_CYLINDER: +3.00
OD_CYLINDER: +2.75
OS_VA1: 20/25
OS_AXIS: 180
OS_VA1: 20/25
OS_CYLINDER: +3.00
OD_SPHERE: -3.50
OD_ADD: +3.50
OD_VA1: 20/20
OD_ADD: +3.50
OS_AXIS: 180
OD_AXIS: 005
OD_ADD: +3.50
OD_SPHERE: -4.25
OD_VA1: 20/30
OD_CYLINDER: +3.25

## 2024-03-02 ASSESSMENT — LID EXAM ASSESSMENTS
OD_BLEPHARITIS: RUL 1+
OS_MEIBOMITIS: LLL T 1+
OS_BLEPHARITIS: LUL 1+
OD_MEIBOMITIS: RLL T 1+

## 2024-03-02 ASSESSMENT — SPHEQUIV_DERIVED
OD_SPHEQUIV: -3.625
OS_SPHEQUIV: -2.5
OD_SPHEQUIV: -2.125
OS_SPHEQUIV: -2.75
OS_SPHEQUIV: -3.125
OD_SPHEQUIV: -2.125
OS_SPHEQUIV: -2.75
OD_SPHEQUIV: -2.625

## 2024-03-07 ENCOUNTER — OFFICE (OUTPATIENT)
Dept: URBAN - METROPOLITAN AREA CLINIC 35 | Facility: CLINIC | Age: 74
Setting detail: OPHTHALMOLOGY
End: 2024-03-07
Payer: MEDICARE

## 2024-03-07 ENCOUNTER — RX ONLY (RX ONLY)
Age: 74
End: 2024-03-07

## 2024-03-07 DIAGNOSIS — Z96.1: ICD-10-CM

## 2024-03-07 PROCEDURE — 99024 POSTOP FOLLOW-UP VISIT: CPT | Performed by: OPHTHALMOLOGY

## 2024-03-07 ASSESSMENT — REFRACTION_MANIFEST
OD_AXIS: 010
OD_CYLINDER: +3.25
OS_CYLINDER: +3.00
OD_CYLINDER: +2.75
OD_ADD: +4.00
OD_AXIS: 005
OS_ADD: +3.50
OD_VA1: 20/25
OS_VA1: 20/25
OD_VA1: 20/20
OS_VA1: 20/20
OD_CYLINDER: +3.25
OS_SPHERE: -4.00
OS_VA1: 20/25
OD_SPHERE: -3.75
OD_SPHERE: -3.50
OD_VA1: 20/40+
OS_AXIS: 180
OS_SPHERE: -4.25
OD_AXIS: 005
OS_AXIS: 180
OS_CYLINDER: +3.00
OS_SPHERE: -4.50
OD_AXIS: 010
OS_ADD: +4.00
OD_SPHERE: -5.25
OS_CYLINDER: +3.00
OD_SPHERE: -4.25
OS_VA1: 20/25
OS_ADD: +3.50
OS_AXIS: 180
OS_SPHERE: -4.25
OS_ADD: +3.50
OS_CYLINDER: +2.75
OS_AXIS: 180
OD_ADD: +3.50
OD_CYLINDER: +3.25
OD_ADD: +3.50
OD_ADD: +3.50
OD_VA1: 20/30

## 2024-03-07 ASSESSMENT — REFRACTION_CURRENTRX
OD_ADD: +3.75
OD_VPRISM_DIRECTION: PROGS
OS_AXIS: 005
OS_ADD: +3.75
OD_SPHERE: -4.50
OS_SPHERE: -4.50
OD_CYLINDER: +3.50
OD_AXIS: 010
OS_VPRISM_DIRECTION: PROGS
OS_OVR_VA: 20/
OS_CYLINDER: +3.00
OD_OVR_VA: 20/

## 2024-03-07 ASSESSMENT — SPHEQUIV_DERIVED
OD_SPHEQUIV: -2.125
OS_SPHEQUIV: -2.75
OD_SPHEQUIV: -2.125
OS_SPHEQUIV: -2.75
OS_SPHEQUIV: -3.125
OD_SPHEQUIV: -3.625
OD_SPHEQUIV: -2.625
OS_SPHEQUIV: -2.5

## 2024-03-14 ENCOUNTER — OFFICE (OUTPATIENT)
Dept: URBAN - METROPOLITAN AREA CLINIC 35 | Facility: CLINIC | Age: 74
Setting detail: OPHTHALMOLOGY
End: 2024-03-14
Payer: MEDICARE

## 2024-03-14 ENCOUNTER — RX ONLY (RX ONLY)
Age: 74
End: 2024-03-14

## 2024-03-14 DIAGNOSIS — Z96.1: ICD-10-CM

## 2024-03-14 PROCEDURE — 99024 POSTOP FOLLOW-UP VISIT: CPT | Performed by: OPHTHALMOLOGY

## 2024-03-14 ASSESSMENT — REFRACTION_MANIFEST
OD_AXIS: 005
OS_VA1: 20/20
OS_SPHERE: -4.25
OD_CYLINDER: +3.25
OS_CYLINDER: +3.00
OS_ADD: +3.50
OD_SPHERE: -5.25
OS_AXIS: 180
OS_ADD: +3.50
OD_CYLINDER: +3.25
OS_SPHERE: -4.00
OS_AXIS: 180
OD_AXIS: 005
OD_ADD: +3.50
OS_SPHERE: -4.50
OD_VA1: 20/20
OS_AXIS: 180
OS_VA1: 20/25
OD_VA1: 20/25
OD_CYLINDER: +3.25
OD_ADD: +3.50
OS_ADD: +4.00
OD_AXIS: 010
OD_CYLINDER: +2.75
OS_CYLINDER: +3.00
OS_VA1: 20/25
OD_SPHERE: -3.50
OD_ADD: +4.00
OS_CYLINDER: +3.00
OD_SPHERE: -4.25
OD_VA1: 20/40+
OD_SPHERE: -3.75
OD_VA1: 20/30
OS_ADD: +3.50
OS_AXIS: 180
OS_CYLINDER: +2.75
OS_SPHERE: -4.25
OD_AXIS: 010
OS_VA1: 20/25
OD_ADD: +3.50

## 2024-03-14 ASSESSMENT — REFRACTION_CURRENTRX
OS_AXIS: 005
OD_AXIS: 010
OS_ADD: +3.75
OD_CYLINDER: +3.50
OS_SPHERE: -4.50
OD_ADD: +3.75
OS_OVR_VA: 20/
OD_VPRISM_DIRECTION: PROGS
OS_VPRISM_DIRECTION: PROGS
OD_OVR_VA: 20/
OS_CYLINDER: +3.00
OD_SPHERE: -4.50

## 2024-03-14 ASSESSMENT — SPHEQUIV_DERIVED
OD_SPHEQUIV: -2.125
OD_SPHEQUIV: -2.125
OD_SPHEQUIV: -3.625
OD_SPHEQUIV: -2.625
OS_SPHEQUIV: -3.125
OS_SPHEQUIV: -2.75
OS_SPHEQUIV: -2.5
OS_SPHEQUIV: -2.75

## 2024-03-20 ENCOUNTER — OFFICE (OUTPATIENT)
Dept: URBAN - METROPOLITAN AREA CLINIC 35 | Facility: CLINIC | Age: 74
Setting detail: OPHTHALMOLOGY
End: 2024-03-20
Payer: MEDICARE

## 2024-03-20 DIAGNOSIS — Z96.1: ICD-10-CM

## 2024-03-20 DIAGNOSIS — H40.1131: ICD-10-CM

## 2024-03-20 PROCEDURE — 99024 POSTOP FOLLOW-UP VISIT: CPT | Performed by: OPHTHALMOLOGY

## 2024-03-20 ASSESSMENT — REFRACTION_MANIFEST
OS_VA1: 20/20
OS_VA1: 20/25
OD_CYLINDER: +3.25
OD_CYLINDER: +3.25
OS_AXIS: 180
OS_VA1: 20/25
OS_CYLINDER: +3.00
OS_CYLINDER: +3.00
OS_ADD: +3.50
OS_ADD: +3.50
OS_SPHERE: -4.25
OD_VA1: 20/20
OD_SPHERE: -5.25
OD_ADD: +3.50
OD_SPHERE: -3.50
OS_SPHERE: -4.50
OD_ADD: +3.50
OS_AXIS: 180
OS_SPHERE: -4.00
OS_SPHERE: -4.25
OS_ADD: +4.00
OD_ADD: +4.00
OD_VA1: 20/30
OS_CYLINDER: +2.75
OD_VA1: 20/25
OD_VA1: 20/40+
OD_CYLINDER: +2.75
OD_SPHERE: -3.75
OD_ADD: +3.50
OS_VA1: 20/25
OD_AXIS: 010
OD_AXIS: 005
OD_AXIS: 005
OD_CYLINDER: +3.25
OS_AXIS: 180
OS_ADD: +3.50
OD_SPHERE: -4.25
OS_AXIS: 180
OD_AXIS: 010
OS_CYLINDER: +3.00

## 2024-03-20 ASSESSMENT — REFRACTION_CURRENTRX
OD_OVR_VA: 20/
OS_ADD: +3.75
OD_CYLINDER: +3.50
OD_VPRISM_DIRECTION: PROGS
OD_SPHERE: -4.50
OS_VPRISM_DIRECTION: PROGS
OS_OVR_VA: 20/
OS_SPHERE: -4.50
OD_ADD: +3.75
OD_AXIS: 010
OS_AXIS: 005
OS_CYLINDER: +3.00

## 2024-03-20 ASSESSMENT — SPHEQUIV_DERIVED
OD_SPHEQUIV: -2.625
OS_SPHEQUIV: -2.5
OD_SPHEQUIV: -2.125
OS_SPHEQUIV: -2.75
OS_SPHEQUIV: -3.125
OD_SPHEQUIV: -2.125
OS_SPHEQUIV: -2.75
OD_SPHEQUIV: -3.625

## 2024-03-20 ASSESSMENT — LID EXAM ASSESSMENTS
OD_BLEPHARITIS: RUL 1+
OS_MEIBOMITIS: LLL T 1+
OD_MEIBOMITIS: RLL T 1+
OS_BLEPHARITIS: LUL 1+

## 2024-04-02 ENCOUNTER — OFFICE (OUTPATIENT)
Dept: URBAN - METROPOLITAN AREA CLINIC 35 | Facility: CLINIC | Age: 74
Setting detail: OPHTHALMOLOGY
End: 2024-04-02
Payer: MEDICARE

## 2024-04-02 DIAGNOSIS — H40.1131: ICD-10-CM

## 2024-04-02 DIAGNOSIS — Z96.1: ICD-10-CM

## 2024-04-02 PROBLEM — H01.004 BLEPHARITIS; RIGHT UPPER LID, , LEFT UPPER LID: Status: ACTIVE | Noted: 2024-03-02

## 2024-04-02 PROBLEM — H00.022 HORDEOLUM INTERNUM; RIGHT LOWER LID, , LEFT LOWER LID: Status: ACTIVE | Noted: 2024-03-02

## 2024-04-02 PROBLEM — H00.025 HORDEOLUM INTERNUM; RIGHT LOWER LID, , LEFT LOWER LID: Status: ACTIVE | Noted: 2024-03-02

## 2024-04-02 PROBLEM — H01.001 BLEPHARITIS; RIGHT UPPER LID, , LEFT UPPER LID: Status: ACTIVE | Noted: 2024-03-02

## 2024-04-02 PROCEDURE — 99024 POSTOP FOLLOW-UP VISIT: CPT | Performed by: OPHTHALMOLOGY

## 2024-04-02 ASSESSMENT — LID EXAM ASSESSMENTS
OS_BLEPHARITIS: LUL 1+
OD_MEIBOMITIS: RLL T 1+
OD_BLEPHARITIS: RUL 1+
OS_MEIBOMITIS: LLL T 1+

## 2024-04-03 ENCOUNTER — OFFICE (OUTPATIENT)
Dept: URBAN - METROPOLITAN AREA CLINIC 34 | Facility: CLINIC | Age: 74
Setting detail: OPHTHALMOLOGY
End: 2024-04-03
Payer: MEDICARE

## 2024-04-03 DIAGNOSIS — H52.7: ICD-10-CM

## 2024-04-03 DIAGNOSIS — H40.1131: ICD-10-CM

## 2024-04-03 DIAGNOSIS — Z96.1: ICD-10-CM

## 2024-04-03 PROBLEM — H11.15 PINGUECULA: Status: ACTIVE | Noted: 2024-04-02

## 2024-04-03 PROCEDURE — 99024 POSTOP FOLLOW-UP VISIT: CPT | Performed by: OPHTHALMOLOGY

## 2024-04-18 RX ORDER — MESALAMINE 375 MG/1
0.38 CAPSULE, EXTENDED RELEASE ORAL
Qty: 360 | Refills: 3 | Status: ACTIVE | COMMUNITY
Start: 2023-05-03 | End: 1900-01-01

## 2024-05-07 ENCOUNTER — OFFICE (OUTPATIENT)
Dept: URBAN - METROPOLITAN AREA CLINIC 34 | Facility: CLINIC | Age: 74
Setting detail: OPHTHALMOLOGY
End: 2024-05-07
Payer: MEDICARE

## 2024-05-07 DIAGNOSIS — H40.1131: ICD-10-CM

## 2024-05-07 DIAGNOSIS — H52.7: ICD-10-CM

## 2024-05-07 DIAGNOSIS — Z96.1: ICD-10-CM

## 2024-05-07 DIAGNOSIS — H26.491: ICD-10-CM

## 2024-05-07 PROCEDURE — 99024 POSTOP FOLLOW-UP VISIT: CPT | Performed by: OPHTHALMOLOGY

## 2024-05-07 ASSESSMENT — CONFRONTATIONAL VISUAL FIELD TEST (CVF)
OD_FINDINGS: FULL
OS_FINDINGS: FULL

## 2024-06-10 ENCOUNTER — APPOINTMENT (OUTPATIENT)
Dept: GASTROENTEROLOGY | Facility: CLINIC | Age: 74
End: 2024-06-10
Payer: MEDICARE

## 2024-06-10 VITALS
BODY MASS INDEX: 25.11 KG/M2 | TEMPERATURE: 97.5 F | WEIGHT: 160 LBS | HEIGHT: 67 IN | SYSTOLIC BLOOD PRESSURE: 110 MMHG | OXYGEN SATURATION: 99 % | HEART RATE: 89 BPM | DIASTOLIC BLOOD PRESSURE: 60 MMHG

## 2024-06-10 DIAGNOSIS — K59.00 CONSTIPATION, UNSPECIFIED: ICD-10-CM

## 2024-06-10 DIAGNOSIS — I25.10 ATHEROSCLEROTIC HEART DISEASE OF NATIVE CORONARY ARTERY W/OUT ANGINA PECTORIS: ICD-10-CM

## 2024-06-10 DIAGNOSIS — E11.9 TYPE 2 DIABETES MELLITUS W/OUT COMPLICATIONS: ICD-10-CM

## 2024-06-10 DIAGNOSIS — K51.90 ULCERATIVE COLITIS, UNSPECIFIED, W/OUT COMPLICATIONS: ICD-10-CM

## 2024-06-10 DIAGNOSIS — G20.C PARKINSONISM, UNSPECIFIED: ICD-10-CM

## 2024-06-10 PROCEDURE — 99214 OFFICE O/P EST MOD 30 MIN: CPT

## 2024-06-10 NOTE — ASSESSMENT
[FreeTextEntry1] : The patient is a 73-year-old male with a history of coronary artery disease and advanced Parkinson's disease.  He has a history of ulcerative colitis which is in remission.  The patient's main issue is that of chronic constipation.  This is on the basis of his underlying neurological issues and medication.  The patient states that his neurologist may consider switching him to a medication which is less constipating.  I switched the patient to Trulance and told him to use MiraLAX every other day.  It will be difficult to regulate the patient's bowel movements however our goal is to at least assure 2-3 bowel movements a week.  The plan was discussed in detail with the patient and his son who was present.

## 2024-06-10 NOTE — REVIEW OF SYSTEMS
[Fever] : no fever [Chills] : no chills [Recent Weight Loss (___ Lbs)] : no recent weight loss [Chest Pain] : no chest pain [Palpitations] : no palpitations [SOB on Exertion] : no shortness of breath during exertion [Abdominal Pain] : no abdominal pain [Constipation] : constipation [Diarrhea] : no diarrhea [Heartburn] : no heartburn [Melena (black stool)] : no melena [As Noted in HPI] : as noted in HPI

## 2024-06-10 NOTE — PHYSICAL EXAM
[Alert] : alert [No Respiratory Distress] : no respiratory distress [Auscultation Breath Sounds / Voice Sounds] : lungs were clear to auscultation bilaterally [Heart Rate And Rhythm] : heart rate was normal and rhythm regular [Murmurs] : no murmurs [Bowel Sounds] : normal bowel sounds [No Masses] : no abdominal mass palpated [Abdomen Soft] : soft [] : no hepatosplenomegaly [Patient Refused] : rectal exam was refused by the patient [de-identified] : The patient has a typical parkinsonian facies.

## 2024-06-11 ENCOUNTER — OFFICE (OUTPATIENT)
Dept: URBAN - METROPOLITAN AREA CLINIC 34 | Facility: CLINIC | Age: 74
Setting detail: OPHTHALMOLOGY
End: 2024-06-11
Payer: MEDICARE

## 2024-06-11 DIAGNOSIS — H40.1131: ICD-10-CM

## 2024-06-11 DIAGNOSIS — Z96.1: ICD-10-CM

## 2024-06-11 DIAGNOSIS — H26.491: ICD-10-CM

## 2024-06-11 PROCEDURE — 99213 OFFICE O/P EST LOW 20 MIN: CPT | Performed by: OPHTHALMOLOGY

## 2024-06-11 PROCEDURE — 92133 CPTRZD OPH DX IMG PST SGM ON: CPT | Performed by: OPHTHALMOLOGY

## 2024-06-11 PROCEDURE — 92083 EXTENDED VISUAL FIELD XM: CPT | Performed by: OPHTHALMOLOGY

## 2024-06-11 ASSESSMENT — LID EXAM ASSESSMENTS
OD_MEIBOMITIS: RLL T 1+
OS_BLEPHARITIS: LUL 1+
OD_BLEPHARITIS: RUL 1+
OS_MEIBOMITIS: LLL T 1+

## 2024-06-11 ASSESSMENT — CONFRONTATIONAL VISUAL FIELD TEST (CVF)
OD_FINDINGS: FULL
OS_FINDINGS: FULL

## 2024-06-12 RX ORDER — LACTULOSE 10 G/15ML
20 SOLUTION ORAL
Qty: 2 | Refills: 0 | Status: ACTIVE | COMMUNITY
Start: 2024-06-12 | End: 1900-01-01

## 2024-07-03 ENCOUNTER — OFFICE (OUTPATIENT)
Dept: URBAN - METROPOLITAN AREA CLINIC 35 | Facility: CLINIC | Age: 74
Setting detail: OPHTHALMOLOGY
End: 2024-07-03
Payer: MEDICARE

## 2024-07-03 DIAGNOSIS — H01.001: ICD-10-CM

## 2024-07-03 DIAGNOSIS — Z96.1: ICD-10-CM

## 2024-07-03 DIAGNOSIS — H16.222: ICD-10-CM

## 2024-07-03 DIAGNOSIS — H01.004: ICD-10-CM

## 2024-07-03 DIAGNOSIS — H18.413: ICD-10-CM

## 2024-07-03 DIAGNOSIS — H26.491: ICD-10-CM

## 2024-07-03 DIAGNOSIS — H40.1131: ICD-10-CM

## 2024-07-03 PROCEDURE — 99213 OFFICE O/P EST LOW 20 MIN: CPT | Performed by: OPHTHALMOLOGY

## 2024-07-03 ASSESSMENT — LID EXAM ASSESSMENTS
OD_BLEPHARITIS: RUL 1+
OS_BLEPHARITIS: LUL 1+
OD_MEIBOMITIS: RLL T 1+
OS_MEIBOMITIS: LLL T 1+

## 2024-07-09 ENCOUNTER — RX RENEWAL (OUTPATIENT)
Age: 74
End: 2024-07-09

## 2024-07-23 ENCOUNTER — DOCTOR'S OFFICE (OUTPATIENT)
Age: 74
Setting detail: OPHTHALMOLOGY
End: 2024-07-23
Payer: MEDICARE

## 2024-07-23 DIAGNOSIS — H16.222: ICD-10-CM

## 2024-07-23 DIAGNOSIS — H40.1131: ICD-10-CM

## 2024-07-23 DIAGNOSIS — H18.413: ICD-10-CM

## 2024-07-23 DIAGNOSIS — Z96.1: ICD-10-CM

## 2024-07-23 DIAGNOSIS — H26.491: ICD-10-CM

## 2024-07-23 PROCEDURE — 99213 OFFICE O/P EST LOW 20 MIN: CPT | Performed by: OPHTHALMOLOGY

## 2024-07-23 PROCEDURE — 92083 EXTENDED VISUAL FIELD XM: CPT | Performed by: OPHTHALMOLOGY

## 2024-07-30 ENCOUNTER — DOCTOR'S OFFICE (OUTPATIENT)
Age: 74
Setting detail: OPHTHALMOLOGY
End: 2024-07-30
Payer: MEDICARE

## 2024-07-30 DIAGNOSIS — H40.1121: ICD-10-CM

## 2024-07-30 PROCEDURE — 65855 TRABECULOPLASTY LASER SURG: CPT | Mod: LT | Performed by: OPHTHALMOLOGY

## 2024-07-30 ASSESSMENT — CONFRONTATIONAL VISUAL FIELD TEST (CVF)
OD_FINDINGS: FULL
OS_FINDINGS: FULL

## 2024-08-26 ENCOUNTER — APPOINTMENT (OUTPATIENT)
Dept: GASTROENTEROLOGY | Facility: CLINIC | Age: 74
End: 2024-08-26
Payer: MEDICARE

## 2024-08-26 VITALS
DIASTOLIC BLOOD PRESSURE: 70 MMHG | SYSTOLIC BLOOD PRESSURE: 110 MMHG | HEART RATE: 88 BPM | BODY MASS INDEX: 25.27 KG/M2 | HEIGHT: 67 IN | TEMPERATURE: 97.7 F | OXYGEN SATURATION: 98 % | WEIGHT: 161 LBS

## 2024-08-26 DIAGNOSIS — K59.00 CONSTIPATION, UNSPECIFIED: ICD-10-CM

## 2024-08-26 DIAGNOSIS — E11.9 TYPE 2 DIABETES MELLITUS W/OUT COMPLICATIONS: ICD-10-CM

## 2024-08-26 DIAGNOSIS — K51.90 ULCERATIVE COLITIS, UNSPECIFIED, W/OUT COMPLICATIONS: ICD-10-CM

## 2024-08-26 DIAGNOSIS — G20.C PARKINSONISM, UNSPECIFIED: ICD-10-CM

## 2024-08-26 PROCEDURE — 99214 OFFICE O/P EST MOD 30 MIN: CPT

## 2024-08-26 RX ORDER — CARBIDOPA AND LEVODOPA 25; 100 MG/1; MG/1
TABLET, EXTENDED RELEASE ORAL
Refills: 0 | Status: ACTIVE | COMMUNITY

## 2024-08-26 RX ORDER — SAFINAMIDE MESYLATE 50 MG/1
50 TABLET, FILM COATED ORAL
Refills: 0 | Status: ACTIVE | COMMUNITY

## 2024-08-26 NOTE — REVIEW OF SYSTEMS
[Fever] : no fever [Feeling Tired] : not feeling tired [Chills] : no chills [Recent Weight Loss (___ Lbs)] : no recent weight loss [Chest Pain] : no chest pain [Palpitations] : no palpitations [SOB on Exertion] : no shortness of breath during exertion [Abdominal Pain] : abdominal pain [Constipation] : constipation [Diarrhea] : no diarrhea [Heartburn] : no heartburn [Bloating (gassiness)] : no bloating [As Noted in HPI] : as noted in HPI

## 2024-08-26 NOTE — ASSESSMENT
[FreeTextEntry1] : The patient is a 74-year-old male with several medical issues.  From the gastrointestinal perspective the patient has a history of ulcerative proctocolitis which is in clinical remission at the present time.  The patient had in fact been complaining of constipation.  This has been remarkably improved since the patient's Parkinson's medication has been changed.  The patient will continue to current regimen and use MiraLAX as needed.  He is not requiring Linzess at the present time.  The patient's colonoscopy will be repeated at the appropriate interval.  Mr. Miner was told to call me with any acute changes in his gastrointestinal status.

## 2024-08-26 NOTE — HISTORY OF PRESENT ILLNESS
[FreeTextEntry1] : The patient had a colonoscopy in May 2023 and had areas of inflammation.  There was no dysplasia [de-identified] : I recent CAT scan done in the hospital revealed mild proctocolitis and constipation

## 2024-08-26 NOTE — PHYSICAL EXAM
[Alert] : alert [No Respiratory Distress] : no respiratory distress [Auscultation Breath Sounds / Voice Sounds] : lungs were clear to auscultation bilaterally [Heart Rate And Rhythm] : heart rate was normal and rhythm regular [Murmurs] : no murmurs [Bowel Sounds] : normal bowel sounds [No Masses] : no abdominal mass palpated [Abdomen Soft] : soft [] : no hepatosplenomegaly

## 2024-08-27 ENCOUNTER — DOCTOR'S OFFICE (OUTPATIENT)
Age: 74
Setting detail: OPHTHALMOLOGY
End: 2024-08-27
Payer: MEDICARE

## 2024-08-27 DIAGNOSIS — H40.1131: ICD-10-CM

## 2024-08-27 PROCEDURE — 99213 OFFICE O/P EST LOW 20 MIN: CPT | Performed by: OPHTHALMOLOGY

## 2024-08-27 ASSESSMENT — LID EXAM ASSESSMENTS
OD_MEIBOMITIS: RLL T 1+
OS_MEIBOMITIS: LLL T 1+
OD_BLEPHARITIS: RUL 1+
OS_BLEPHARITIS: LUL 1+

## 2024-08-27 ASSESSMENT — CONFRONTATIONAL VISUAL FIELD TEST (CVF)
OS_FINDINGS: FULL
OD_FINDINGS: FULL

## 2024-09-24 ENCOUNTER — DOCTOR'S OFFICE (OUTPATIENT)
Facility: LOCATION | Age: 74
Setting detail: OPHTHALMOLOGY
End: 2024-09-24
Payer: MEDICARE

## 2024-09-24 DIAGNOSIS — Z96.1: ICD-10-CM

## 2024-09-24 DIAGNOSIS — H40.1131: ICD-10-CM

## 2024-09-24 DIAGNOSIS — H16.222: ICD-10-CM

## 2024-09-24 DIAGNOSIS — H18.413: ICD-10-CM

## 2024-09-24 PROBLEM — H40.1111 POAG; RIGHT EYE MILD, LEFT EYE MODERATE: Status: ACTIVE | Noted: 2024-09-24

## 2024-09-24 PROBLEM — H40.1122 POAG; RIGHT EYE MILD, LEFT EYE MODERATE: Status: ACTIVE | Noted: 2024-09-24

## 2024-09-24 PROCEDURE — 99213 OFFICE O/P EST LOW 20 MIN: CPT | Performed by: OPHTHALMOLOGY

## 2024-11-04 ENCOUNTER — DOCTOR'S OFFICE (OUTPATIENT)
Facility: LOCATION | Age: 74
Setting detail: OPHTHALMOLOGY
End: 2024-11-04
Payer: MEDICARE

## 2024-11-04 ENCOUNTER — RX ONLY (RX ONLY)
Age: 74
End: 2024-11-04

## 2024-11-04 DIAGNOSIS — H40.1122: ICD-10-CM

## 2024-11-04 DIAGNOSIS — H16.222: ICD-10-CM

## 2024-11-04 DIAGNOSIS — H40.1111: ICD-10-CM

## 2024-11-04 DIAGNOSIS — Z96.1: ICD-10-CM

## 2024-11-04 DIAGNOSIS — H18.413: ICD-10-CM

## 2024-11-04 PROCEDURE — 99213 OFFICE O/P EST LOW 20 MIN: CPT | Performed by: OPHTHALMOLOGY

## 2024-11-04 ASSESSMENT — REFRACTION_MANIFEST
OS_VA1: 20/25
OD_CYLINDER: +3.25
OS_ADD: +3.50
OD_AXIS: 005
OS_CYLINDER: +3.00
OS_SPHERE: -0.75
OD_ADD: +3.50
OS_AXIS: 180
OD_SPHERE: -3.75
OS_CYLINDER: +3.00
OS_ADD: +3.50
OS_CYLINDER: +2.75
OD_SPHERE: -5.25
OS_CYLINDER: SPH
OD_VA1: 20/30
OS_CYLINDER: +3.00
OD_VA1: 20/40+
OS_VA1: 20/25
OS_SPHERE: -4.00
OS_ADD: +4.00
OD_ADD: +3.50
OS_VA1: 20/20
OS_ADD: +3.50
OD_CYLINDER: +2.75
OD_CYLINDER: +3.25
OS_AXIS: 180
OD_AXIS: 010
OS_AXIS: 180
OD_ADD: +4.00
OD_SPHERE: -3.50
OD_SPHERE: -4.25
OD_AXIS: 010
OD_CYLINDER: +3.25
OD_AXIS: 005
OD_VA1: 20/25
OS_AXIS: 180
OS_SPHERE: -4.50
OS_VA1: 20/25
OS_SPHERE: -4.25
OS_SPHERE: -4.25
OD_VA1: 20/20
OD_ADD: +3.50

## 2024-11-04 ASSESSMENT — PACHYMETRY
OS_CT_CORRECTION: 1
OD_CT_UM: 532
OD_CT_CORRECTION: 1
OS_CT_UM: 535

## 2024-11-04 ASSESSMENT — REFRACTION_AUTOREFRACTION
OS_SPHERE: -0.75
OD_CYLINDER: +0.25
OS_AXIS: 100
OD_SPHERE: -0.25
OD_AXIS: 020
OS_CYLINDER: +0.25

## 2024-11-04 ASSESSMENT — REFRACTION_CURRENTRX
OD_CYLINDER: +3.50
OS_CYLINDER: +3.00
OS_OVR_VA: 20/
OD_AXIS: 010
OS_ADD: +3.75
OD_VPRISM_DIRECTION: PROGS
OS_SPHERE: -4.50
OD_OVR_VA: 20/
OS_VPRISM_DIRECTION: PROGS
OD_SPHERE: -4.50
OS_AXIS: 005
OD_ADD: +3.75

## 2024-11-04 ASSESSMENT — KERATOMETRY
OD_AXISANGLE_DEGREES: 007
OS_AXISANGLE_DEGREES: 014
OS_K2POWER_DIOPTERS: 45.00
OD_K2POWER_DIOPTERS: 44.75
METHOD_AUTO_MANUAL: AUTO
OS_K1POWER_DIOPTERS: 43.50
OD_K1POWER_DIOPTERS: 43.50

## 2024-11-04 ASSESSMENT — TONOMETRY
OD_IOP_MMHG: 16
OS_IOP_MMHG: 12

## 2024-11-04 ASSESSMENT — DRY EYES - PHYSICIAN NOTES: OD_GENERALCOMMENTS: TEAR FILM DEBRIS

## 2024-11-04 ASSESSMENT — VISUAL ACUITY
OD_BCVA: 20/20
OS_BCVA: 20/20

## 2024-11-04 ASSESSMENT — CONFRONTATIONAL VISUAL FIELD TEST (CVF)
OS_FINDINGS: FULL
OD_FINDINGS: FULL

## 2024-11-12 ENCOUNTER — DOCTOR'S OFFICE (OUTPATIENT)
Facility: LOCATION | Age: 74
Setting detail: OPHTHALMOLOGY
End: 2024-11-12
Payer: MEDICARE

## 2024-11-12 DIAGNOSIS — H18.413: ICD-10-CM

## 2024-11-12 DIAGNOSIS — H40.1111: ICD-10-CM

## 2024-11-12 DIAGNOSIS — Z96.1: ICD-10-CM

## 2024-11-12 DIAGNOSIS — H40.1122: ICD-10-CM

## 2024-11-12 DIAGNOSIS — H02.402: ICD-10-CM

## 2024-11-12 PROCEDURE — 99213 OFFICE O/P EST LOW 20 MIN: CPT | Performed by: OPHTHALMOLOGY

## 2024-11-12 ASSESSMENT — VISUAL ACUITY
OD_BCVA: 20/20
OS_BCVA: 20/20

## 2024-11-12 ASSESSMENT — REFRACTION_CURRENTRX
OS_ADD: +3.75
OS_SPHERE: -4.50
OD_VPRISM_DIRECTION: PROGS
OD_CYLINDER: +3.50
OS_AXIS: 005
OD_SPHERE: -4.50
OD_ADD: +3.75
OD_AXIS: 010
OS_VPRISM_DIRECTION: PROGS
OS_CYLINDER: +3.00
OS_OVR_VA: 20/
OD_OVR_VA: 20/

## 2024-11-12 ASSESSMENT — TONOMETRY
OS_IOP_MMHG: 11
OD_IOP_MMHG: 13

## 2024-11-12 ASSESSMENT — REFRACTION_MANIFEST
OD_ADD: +3.50
OD_SPHERE: -5.25
OS_ADD: +4.00
OD_AXIS: 005
OS_VA1: 20/25
OS_ADD: +3.50
OD_CYLINDER: +2.75
OS_VA1: 20/20
OD_AXIS: 005
OD_CYLINDER: +3.25
OD_ADD: +3.50
OS_CYLINDER: +3.00
OD_CYLINDER: +3.25
OS_CYLINDER: +2.75
OS_AXIS: 180
OD_VA1: 20/40+
OS_CYLINDER: SPH
OS_SPHERE: -4.50
OS_AXIS: 180
OD_ADD: +4.00
OD_SPHERE: -4.25
OD_SPHERE: -3.50
OD_AXIS: 010
OS_AXIS: 180
OD_SPHERE: -3.75
OS_SPHERE: -0.75
OD_AXIS: 010
OS_CYLINDER: +3.00
OS_SPHERE: -4.25
OS_SPHERE: -4.00
OS_ADD: +3.50
OS_ADD: +3.50
OS_SPHERE: -4.25
OD_VA1: 20/25
OS_VA1: 20/25
OS_AXIS: 180
OD_ADD: +3.50
OS_CYLINDER: +3.00
OD_VA1: 20/30
OD_CYLINDER: +3.25
OD_VA1: 20/20
OS_VA1: 20/25

## 2024-11-12 ASSESSMENT — REFRACTION_AUTOREFRACTION
OS_SPHERE: -0.50
OD_CYLINDER: +0.25
OD_SPHERE: -0.25
OS_AXIS: 179
OD_AXIS: 020
OS_CYLINDER: +0.25

## 2024-11-12 ASSESSMENT — PACHYMETRY
OD_CT_CORRECTION: 1
OS_CT_UM: 535
OS_CT_CORRECTION: 1
OD_CT_UM: 532

## 2024-11-12 ASSESSMENT — KERATOMETRY
OS_K1POWER_DIOPTERS: 43.00
OS_K2POWER_DIOPTERS: 44.75
OD_K1POWER_DIOPTERS: 42.50
OD_AXISANGLE_DEGREES: 005
OS_AXISANGLE_DEGREES: 008
OD_K2POWER_DIOPTERS: 44.25
METHOD_AUTO_MANUAL: AUTO

## 2024-11-12 ASSESSMENT — CONFRONTATIONAL VISUAL FIELD TEST (CVF)
OD_FINDINGS: FULL
OS_FINDINGS: FULL

## 2024-11-12 ASSESSMENT — LID EXAM ASSESSMENTS
OS_MEIBOMITIS: LLL T 1+
OD_BLEPHARITIS: RUL 1+
OD_MEIBOMITIS: RLL T 1+
OS_BLEPHARITIS: LUL 1+

## 2024-11-12 ASSESSMENT — LID POSITION - PTOSIS: OS_PTOSIS: LUL T

## 2024-11-12 ASSESSMENT — DRY EYES - PHYSICIAN NOTES: OD_GENERALCOMMENTS: TEAR FILM DEBRIS

## 2024-12-02 PROBLEM — H04.553 NLDO: Status: ACTIVE | Noted: 2024-12-02

## 2025-01-06 ENCOUNTER — APPOINTMENT (OUTPATIENT)
Dept: GASTROENTEROLOGY | Facility: CLINIC | Age: 75
End: 2025-01-06
Payer: MEDICARE

## 2025-01-06 VITALS
HEART RATE: 71 BPM | BODY MASS INDEX: 25.58 KG/M2 | HEIGHT: 67 IN | OXYGEN SATURATION: 99 % | SYSTOLIC BLOOD PRESSURE: 102 MMHG | DIASTOLIC BLOOD PRESSURE: 64 MMHG | WEIGHT: 163 LBS | TEMPERATURE: 97.1 F

## 2025-01-06 DIAGNOSIS — K51.90 ULCERATIVE COLITIS, UNSPECIFIED, W/OUT COMPLICATIONS: ICD-10-CM

## 2025-01-06 DIAGNOSIS — E11.9 TYPE 2 DIABETES MELLITUS W/OUT COMPLICATIONS: ICD-10-CM

## 2025-01-06 DIAGNOSIS — K59.00 CONSTIPATION, UNSPECIFIED: ICD-10-CM

## 2025-01-06 DIAGNOSIS — I25.10 ATHEROSCLEROTIC HEART DISEASE OF NATIVE CORONARY ARTERY W/OUT ANGINA PECTORIS: ICD-10-CM

## 2025-01-06 DIAGNOSIS — G20.C PARKINSONISM, UNSPECIFIED: ICD-10-CM

## 2025-01-06 PROCEDURE — 99214 OFFICE O/P EST MOD 30 MIN: CPT

## 2025-01-29 ENCOUNTER — APPOINTMENT (OUTPATIENT)
Dept: GASTROENTEROLOGY | Facility: AMBULATORY MEDICAL SERVICES | Age: 75
End: 2025-01-29
Payer: MEDICARE

## 2025-01-29 PROCEDURE — 45380 COLONOSCOPY AND BIOPSY: CPT

## 2025-02-25 ENCOUNTER — DOCTOR'S OFFICE (OUTPATIENT)
Facility: LOCATION | Age: 75
Setting detail: OPHTHALMOLOGY
End: 2025-02-25
Payer: MEDICARE

## 2025-02-25 DIAGNOSIS — H02.402: ICD-10-CM

## 2025-02-25 DIAGNOSIS — H40.1111: ICD-10-CM

## 2025-02-25 DIAGNOSIS — H40.1122: ICD-10-CM

## 2025-02-25 DIAGNOSIS — Z96.1: ICD-10-CM

## 2025-02-25 DIAGNOSIS — H18.413: ICD-10-CM

## 2025-02-25 PROCEDURE — 92012 INTRM OPH EXAM EST PATIENT: CPT | Performed by: OPHTHALMOLOGY

## 2025-02-25 ASSESSMENT — REFRACTION_MANIFEST
OS_SPHERE: -4.25
OD_AXIS: 005
OD_VA1: 20/20
OD_ADD: +3.50
OD_CYLINDER: +3.25
OD_SPHERE: -4.25
OS_SPHERE: -0.75
OD_ADD: +4.00
OS_VA1: 20/20
OS_SPHERE: -4.50
OS_ADD: +3.50
OS_VA1: 20/25
OS_ADD: +3.50
OS_CYLINDER: +2.75
OD_AXIS: 010
OS_SPHERE: -4.25
OD_AXIS: 005
OD_AXIS: 010
OS_CYLINDER: +3.00
OD_CYLINDER: +2.75
OS_AXIS: 180
OS_VA1: 20/25
OD_VA1: 20/25
OD_SPHERE: -5.25
OS_ADD: +3.50
OD_CYLINDER: +3.25
OD_ADD: +3.50
OD_SPHERE: -3.75
OD_VA1: 20/40+
OD_ADD: +3.50
OS_AXIS: 180
OD_SPHERE: -3.50
OS_ADD: +4.00
OS_CYLINDER: +3.00
OS_CYLINDER: +3.00
OD_VA1: 20/30
OS_AXIS: 180
OS_CYLINDER: SPH
OS_AXIS: 180
OS_VA1: 20/25
OD_CYLINDER: +3.25
OS_SPHERE: -4.00

## 2025-02-25 ASSESSMENT — REFRACTION_CURRENTRX
OD_AXIS: 010
OD_SPHERE: -4.50
OD_ADD: +3.75
OS_CYLINDER: +3.00
OS_VPRISM_DIRECTION: PROGS
OS_AXIS: 005
OD_OVR_VA: 20/
OS_SPHERE: -4.50
OS_OVR_VA: 20/
OS_ADD: +3.75
OD_CYLINDER: +3.50
OD_VPRISM_DIRECTION: PROGS

## 2025-02-25 ASSESSMENT — REFRACTION_AUTOREFRACTION
OS_AXIS: 000
OD_CYLINDER: +0.25
OS_SPHERE: -0.50
OD_SPHERE: -0.75
OD_AXIS: 033
OS_CYLINDER: 0.00

## 2025-02-25 ASSESSMENT — CONFRONTATIONAL VISUAL FIELD TEST (CVF)
OS_FINDINGS: FULL
OD_FINDINGS: FULL

## 2025-02-25 ASSESSMENT — LID EXAM ASSESSMENTS
OS_BLEPHARITIS: LUL 1+
OD_BLEPHARITIS: RUL 1+
OS_MEIBOMITIS: LLL T 1+
OD_MEIBOMITIS: RLL T 1+

## 2025-02-25 ASSESSMENT — KERATOMETRY
OD_K1POWER_DIOPTERS: 42.50
OD_AXISANGLE_DEGREES: 004
OS_K1POWER_DIOPTERS: 42.75
OS_AXISANGLE_DEGREES: 003
OD_K2POWER_DIOPTERS: 44.25
METHOD_AUTO_MANUAL: AUTO
OS_K2POWER_DIOPTERS: 44.25

## 2025-02-25 ASSESSMENT — TONOMETRY
OD_IOP_MMHG: 19
OS_IOP_MMHG: 13

## 2025-02-25 ASSESSMENT — DRY EYES - PHYSICIAN NOTES: OD_GENERALCOMMENTS: TEAR FILM DEBRIS

## 2025-02-25 ASSESSMENT — PACHYMETRY
OS_CT_UM: 535
OD_CT_UM: 532
OD_CT_CORRECTION: 1
OS_CT_CORRECTION: 1

## 2025-02-25 ASSESSMENT — LID POSITION - PTOSIS: OS_PTOSIS: LUL T

## 2025-02-25 ASSESSMENT — VISUAL ACUITY
OD_BCVA: 20/20
OS_BCVA: 20/20

## 2025-06-25 ENCOUNTER — DOCTOR'S OFFICE (OUTPATIENT)
Facility: LOCATION | Age: 75
Setting detail: OPHTHALMOLOGY
End: 2025-06-25
Payer: MEDICARE

## 2025-06-25 DIAGNOSIS — H40.1122: ICD-10-CM

## 2025-06-25 DIAGNOSIS — H40.1111: ICD-10-CM

## 2025-06-25 DIAGNOSIS — H00.15: ICD-10-CM

## 2025-06-25 DIAGNOSIS — H00.11: ICD-10-CM

## 2025-06-25 PROCEDURE — 99213 OFFICE O/P EST LOW 20 MIN: CPT | Performed by: OPHTHALMOLOGY

## 2025-06-25 ASSESSMENT — REFRACTION_MANIFEST
OS_SPHERE: -4.00
OS_SPHERE: -4.25
OS_CYLINDER: +3.00
OD_CYLINDER: +3.25
OS_ADD: +3.50
OD_ADD: +3.50
OS_AXIS: 180
OS_SPHERE: -0.75
OS_SPHERE: -4.50
OS_ADD: +3.50
OD_ADD: +4.00
OD_VA1: 20/25
OD_SPHERE: -3.50
OS_AXIS: 180
OD_VA1: 20/20
OD_AXIS: 005
OD_CYLINDER: +2.75
OS_AXIS: 180
OS_VA1: 20/25
OS_VA1: 20/25
OD_SPHERE: -3.75
OS_VA1: 20/25
OS_ADD: +4.00
OS_CYLINDER: +2.75
OS_ADD: +3.50
OD_CYLINDER: +3.25
OD_VA1: 20/40+
OD_AXIS: 010
OS_VA1: 20/20
OS_SPHERE: -4.25
OS_AXIS: 180
OD_SPHERE: -5.25
OS_CYLINDER: SPH
OS_CYLINDER: +3.00
OD_AXIS: 005
OD_SPHERE: -4.25
OD_AXIS: 010
OD_ADD: +3.50
OD_CYLINDER: +3.25
OD_VA1: 20/30
OS_CYLINDER: +3.00
OD_ADD: +3.50

## 2025-06-25 ASSESSMENT — REFRACTION_AUTOREFRACTION
OD_AXIS: 054
OS_AXIS: 000
OS_SPHERE: -0.50
OD_SPHERE: -0.75
OD_CYLINDER: +0.50
OS_CYLINDER: 0.00

## 2025-06-25 ASSESSMENT — LID POSITION - PTOSIS: OS_PTOSIS: LUL T

## 2025-06-25 ASSESSMENT — DRY EYES - PHYSICIAN NOTES: OD_GENERALCOMMENTS: TEAR FILM DEBRIS

## 2025-06-25 ASSESSMENT — LID EXAM ASSESSMENTS
OD_MEIBOMITIS: RLL T 1+
OD_BLEPHARITIS: RUL 1+
OS_MEIBOMITIS: LLL T 1+
OS_BLEPHARITIS: LUL 1+
OS_COMMENTS: TELANGIECTASIA, SHORTENED GLANDS
OD_COMMENTS: TELANGIECTASIA, SHORTENED GLANDS

## 2025-06-25 ASSESSMENT — KERATOMETRY
OD_AXISANGLE_DEGREES: 004
OD_K2POWER_DIOPTERS: 44.25
OD_K1POWER_DIOPTERS: 42.50
METHOD_AUTO_MANUAL: AUTO
OS_K2POWER_DIOPTERS: 44.25
OS_AXISANGLE_DEGREES: 003
OS_K1POWER_DIOPTERS: 42.75

## 2025-06-25 ASSESSMENT — PACHYMETRY
OD_CT_CORRECTION: 1
OS_CT_CORRECTION: 1
OS_CT_UM: 535
OD_CT_UM: 532

## 2025-06-25 ASSESSMENT — VISUAL ACUITY
OS_BCVA: 20/25+-
OD_BCVA: 20/20

## 2025-06-25 ASSESSMENT — REFRACTION_CURRENTRX
OS_OVR_VA: 20/
OD_ADD: +3.75
OD_AXIS: 010
OD_SPHERE: -4.50
OS_ADD: +3.75
OS_VPRISM_DIRECTION: PROGS
OD_VPRISM_DIRECTION: PROGS
OS_SPHERE: -4.50
OS_CYLINDER: +3.00
OS_AXIS: 005
OD_CYLINDER: +3.50
OD_OVR_VA: 20/

## 2025-06-25 ASSESSMENT — TONOMETRY
OD_IOP_MMHG: 18
OS_IOP_MMHG: 11